# Patient Record
Sex: FEMALE | Race: WHITE | NOT HISPANIC OR LATINO | Employment: FULL TIME | URBAN - METROPOLITAN AREA
[De-identification: names, ages, dates, MRNs, and addresses within clinical notes are randomized per-mention and may not be internally consistent; named-entity substitution may affect disease eponyms.]

---

## 2023-03-22 ENCOUNTER — TELEPHONE (OUTPATIENT)
Dept: PRIMARY CARE | Facility: CLINIC | Age: 63
End: 2023-03-22
Payer: COMMERCIAL

## 2023-03-24 DIAGNOSIS — Z00.00 ROUTINE GENERAL MEDICAL EXAMINATION AT A HEALTH CARE FACILITY: ICD-10-CM

## 2023-03-24 DIAGNOSIS — Z12.31 BREAST CANCER SCREENING BY MAMMOGRAM: ICD-10-CM

## 2023-03-24 DIAGNOSIS — Z78.0 ASYMPTOMATIC POSTMENOPAUSAL STATE: Primary | ICD-10-CM

## 2023-05-12 ENCOUNTER — LAB (OUTPATIENT)
Dept: LAB | Facility: LAB | Age: 63
End: 2023-05-12
Payer: COMMERCIAL

## 2023-05-12 ENCOUNTER — OFFICE VISIT (OUTPATIENT)
Dept: PRIMARY CARE | Facility: CLINIC | Age: 63
End: 2023-05-12
Payer: COMMERCIAL

## 2023-05-12 VITALS
TEMPERATURE: 98 F | HEIGHT: 64 IN | OXYGEN SATURATION: 95 % | BODY MASS INDEX: 23.22 KG/M2 | SYSTOLIC BLOOD PRESSURE: 99 MMHG | WEIGHT: 136 LBS | DIASTOLIC BLOOD PRESSURE: 57 MMHG | RESPIRATION RATE: 16 BRPM | HEART RATE: 70 BPM

## 2023-05-12 DIAGNOSIS — N95.2 ATROPHIC VAGINITIS: ICD-10-CM

## 2023-05-12 DIAGNOSIS — M25.561 PAIN IN BOTH KNEES, UNSPECIFIED CHRONICITY: ICD-10-CM

## 2023-05-12 DIAGNOSIS — Z12.31 BREAST CANCER SCREENING BY MAMMOGRAM: ICD-10-CM

## 2023-05-12 DIAGNOSIS — A60.00 GENITAL HERPES SIMPLEX, UNSPECIFIED SITE: ICD-10-CM

## 2023-05-12 DIAGNOSIS — R05.3 CHRONIC COUGH: ICD-10-CM

## 2023-05-12 DIAGNOSIS — M25.562 PAIN IN BOTH KNEES, UNSPECIFIED CHRONICITY: ICD-10-CM

## 2023-05-12 DIAGNOSIS — Z00.00 ROUTINE GENERAL MEDICAL EXAMINATION AT A HEALTH CARE FACILITY: ICD-10-CM

## 2023-05-12 DIAGNOSIS — Z00.00 ROUTINE GENERAL MEDICAL EXAMINATION AT A HEALTH CARE FACILITY: Primary | ICD-10-CM

## 2023-05-12 LAB
ALANINE AMINOTRANSFERASE (SGPT) (U/L) IN SER/PLAS: 10 U/L (ref 7–45)
ALBUMIN (G/DL) IN SER/PLAS: 4.2 G/DL (ref 3.4–5)
ALKALINE PHOSPHATASE (U/L) IN SER/PLAS: 107 U/L (ref 33–136)
ANION GAP IN SER/PLAS: 12 MMOL/L (ref 10–20)
ASPARTATE AMINOTRANSFERASE (SGOT) (U/L) IN SER/PLAS: 12 U/L (ref 9–39)
BASOPHILS (10*3/UL) IN BLOOD BY AUTOMATED COUNT: 0.02 X10E9/L (ref 0–0.1)
BASOPHILS/100 LEUKOCYTES IN BLOOD BY AUTOMATED COUNT: 0.4 % (ref 0–2)
BILIRUBIN TOTAL (MG/DL) IN SER/PLAS: 0.5 MG/DL (ref 0–1.2)
CALCIDIOL (25 OH VITAMIN D3) (NG/ML) IN SER/PLAS: 36 NG/ML
CALCIUM (MG/DL) IN SER/PLAS: 10.2 MG/DL (ref 8.6–10.6)
CARBON DIOXIDE, TOTAL (MMOL/L) IN SER/PLAS: 25 MMOL/L (ref 21–32)
CHLORIDE (MMOL/L) IN SER/PLAS: 109 MMOL/L (ref 98–107)
CHOLESTEROL (MG/DL) IN SER/PLAS: 198 MG/DL (ref 0–199)
CHOLESTEROL IN HDL (MG/DL) IN SER/PLAS: 62.9 MG/DL
CHOLESTEROL/HDL RATIO: 3.1
CREATININE (MG/DL) IN SER/PLAS: 0.8 MG/DL (ref 0.5–1.05)
EOSINOPHILS (10*3/UL) IN BLOOD BY AUTOMATED COUNT: 0.06 X10E9/L (ref 0–0.7)
EOSINOPHILS/100 LEUKOCYTES IN BLOOD BY AUTOMATED COUNT: 1.3 % (ref 0–6)
ERYTHROCYTE DISTRIBUTION WIDTH (RATIO) BY AUTOMATED COUNT: 12.2 % (ref 11.5–14.5)
ERYTHROCYTE MEAN CORPUSCULAR HEMOGLOBIN CONCENTRATION (G/DL) BY AUTOMATED: 32.5 G/DL (ref 32–36)
ERYTHROCYTE MEAN CORPUSCULAR VOLUME (FL) BY AUTOMATED COUNT: 98 FL (ref 80–100)
ERYTHROCYTES (10*6/UL) IN BLOOD BY AUTOMATED COUNT: 3.89 X10E12/L (ref 4–5.2)
ESTIMATED AVERAGE GLUCOSE FOR HBA1C: 108 MG/DL
GFR FEMALE: 82 ML/MIN/1.73M2
GLUCOSE (MG/DL) IN SER/PLAS: 109 MG/DL (ref 74–99)
HEMATOCRIT (%) IN BLOOD BY AUTOMATED COUNT: 38.2 % (ref 36–46)
HEMOGLOBIN (G/DL) IN BLOOD: 12.4 G/DL (ref 12–16)
HEMOGLOBIN A1C/HEMOGLOBIN TOTAL IN BLOOD: 5.4 %
IMMATURE GRANULOCYTES/100 LEUKOCYTES IN BLOOD BY AUTOMATED COUNT: 0 % (ref 0–0.9)
IRON (UG/DL) IN SER/PLAS: 107 UG/DL (ref 35–150)
IRON BINDING CAPACITY (UG/DL) IN SER/PLAS: 422 UG/DL (ref 240–445)
IRON SATURATION (%) IN SER/PLAS: 25 % (ref 25–45)
LDL: 102 MG/DL (ref 0–99)
LEAD (UG/DL) IN BLOOD: <0.5 UG/DL (ref 0–4.9)
LEUKOCYTES (10*3/UL) IN BLOOD BY AUTOMATED COUNT: 4.6 X10E9/L (ref 4.4–11.3)
LYMPHOCYTES (10*3/UL) IN BLOOD BY AUTOMATED COUNT: 1.66 X10E9/L (ref 1.2–4.8)
LYMPHOCYTES/100 LEUKOCYTES IN BLOOD BY AUTOMATED COUNT: 36.1 % (ref 13–44)
MONOCYTES (10*3/UL) IN BLOOD BY AUTOMATED COUNT: 0.35 X10E9/L (ref 0.1–1)
MONOCYTES/100 LEUKOCYTES IN BLOOD BY AUTOMATED COUNT: 7.6 % (ref 2–10)
NEUTROPHILS (10*3/UL) IN BLOOD BY AUTOMATED COUNT: 2.51 X10E9/L (ref 1.2–7.7)
NEUTROPHILS/100 LEUKOCYTES IN BLOOD BY AUTOMATED COUNT: 54.6 % (ref 40–80)
NRBC (PER 100 WBCS) BY AUTOMATED COUNT: 0 /100 WBC (ref 0–0)
PLATELETS (10*3/UL) IN BLOOD AUTOMATED COUNT: 208 X10E9/L (ref 150–450)
POTASSIUM (MMOL/L) IN SER/PLAS: 3.9 MMOL/L (ref 3.5–5.3)
PROTEIN TOTAL: 6.7 G/DL (ref 6.4–8.2)
SODIUM (MMOL/L) IN SER/PLAS: 142 MMOL/L (ref 136–145)
THYROTROPIN (MIU/L) IN SER/PLAS BY DETECTION LIMIT <= 0.05 MIU/L: 0.9 MIU/L (ref 0.44–3.98)
TRIGLYCERIDE (MG/DL) IN SER/PLAS: 166 MG/DL (ref 0–149)
UREA NITROGEN (MG/DL) IN SER/PLAS: 25 MG/DL (ref 6–23)
VLDL: 33 MG/DL (ref 0–40)

## 2023-05-12 PROCEDURE — 83655 ASSAY OF LEAD: CPT

## 2023-05-12 PROCEDURE — 84443 ASSAY THYROID STIM HORMONE: CPT

## 2023-05-12 PROCEDURE — 80061 LIPID PANEL: CPT

## 2023-05-12 PROCEDURE — 80053 COMPREHEN METABOLIC PANEL: CPT

## 2023-05-12 PROCEDURE — 83550 IRON BINDING TEST: CPT

## 2023-05-12 PROCEDURE — 83540 ASSAY OF IRON: CPT

## 2023-05-12 PROCEDURE — 83036 HEMOGLOBIN GLYCOSYLATED A1C: CPT

## 2023-05-12 PROCEDURE — 99396 PREV VISIT EST AGE 40-64: CPT | Performed by: INTERNAL MEDICINE

## 2023-05-12 PROCEDURE — 85025 COMPLETE CBC W/AUTO DIFF WBC: CPT

## 2023-05-12 PROCEDURE — 82306 VITAMIN D 25 HYDROXY: CPT

## 2023-05-12 PROCEDURE — 36415 COLL VENOUS BLD VENIPUNCTURE: CPT

## 2023-05-12 RX ORDER — FOLIC ACID 1 MG/1
1 TABLET ORAL
COMMUNITY

## 2023-05-12 RX ORDER — ESTRADIOL 0.1 MG/G
2 CREAM VAGINAL DAILY
COMMUNITY
Start: 2022-10-25 | End: 2023-05-12 | Stop reason: SDUPTHER

## 2023-05-12 RX ORDER — OMEPRAZOLE 20 MG/1
20 CAPSULE, DELAYED RELEASE ORAL
COMMUNITY
Start: 2022-05-11 | End: 2023-05-12

## 2023-05-12 RX ORDER — ACYCLOVIR 800 MG/1
TABLET ORAL
Qty: 30 TABLET | Refills: 0 | Status: SHIPPED | OUTPATIENT
Start: 2023-05-12

## 2023-05-12 RX ORDER — ESTRADIOL 0.1 MG/G
2 CREAM VAGINAL DAILY
Qty: 42.5 G | Refills: 11 | Status: SHIPPED | OUTPATIENT
Start: 2023-05-12

## 2023-05-12 NOTE — PATIENT INSTRUCTIONS
Beatrice,     You are du for full lab work today.   You are due for mammogram August 3, 2023 or later; call [phone] to schedule   You are scheduled to see GI on 5.25.23 at 130PM and your endoscopy is scheduled 7.26.23 at 8AM at [facility].   Your tetanus shot is good until 2031! You are up to date with the shingles shot forever!   We don't ordinarily do bone density testing until age 65 and a pneumonia shot is also indicated at age 65!   Chest x-ray ordered for [address] radiology as a walk in!   For knee pain; you will also do bilateral knee x-rays today! If arthritis, I can discuss next steps (steroid injections into the knees done with orthopedics) - if NO arthritis, we would google exercises for patellofemoral tendinitis which is the other diagnosis this could be!   Try artificial tears like Systane Ultra preservative free!  See me in a year, sooner if needed!       CL

## 2023-05-12 NOTE — PROGRESS NOTES
Beatrice Moya is a 63 yo F presenting for CPE.     1. Odynophagia started PPI last year; messier stools described last eyar  [ ]s/p GI refrral 4.23 - pantoprazole uptitrated to BID   [ ]s/p ENT referral 3.23 felt c/w GERD   -tried the PPI and BID PPI but now off with no recurrence   -rare occasional Tums   -persistent but mild sxs   [ ]seeing GI in FU  5.25.23 and has EGD     3. Rectocele s/p repair, ?POP   -FU gyne ([facility])     4. Anxiety with concern for ADHD in the past     5. L index finger pain; h/o R lateral epicondylitis and L wrist tendinitis     6. PreDM     7. Occ LBP     8. Genital HSV h/o Valtrex prophy   -uses acyclovir presently abortively     9. Atrophic vaginitis   -estrogen topically with good relief!     10. Occasional R knee pain; brief when occurs but sharp in character       #HM   -shingrix x2; tdap 2021   [ ]labs   -paps OSH gyne   -cscope 3.21 - 10 yrs   -mammo 8.3.22         Gen Aox3 NAD well appearing   HEENT mmm pharynx clear no cervical LAD   Eyes sclerae clear   CV rrr nl s1/2 no m/r/g  Pulm CTAB no adventitious sounds   Ext warm dry no edema 2+ DP pulse

## 2023-07-26 ENCOUNTER — HOSPITAL ENCOUNTER (OUTPATIENT)
Dept: DATA CONVERSION | Facility: HOSPITAL | Age: 63
End: 2023-07-26
Attending: STUDENT IN AN ORGANIZED HEALTH CARE EDUCATION/TRAINING PROGRAM | Admitting: STUDENT IN AN ORGANIZED HEALTH CARE EDUCATION/TRAINING PROGRAM
Payer: COMMERCIAL

## 2023-07-26 DIAGNOSIS — R12 HEARTBURN: ICD-10-CM

## 2023-07-26 DIAGNOSIS — K21.9 GASTRO-ESOPHAGEAL REFLUX DISEASE WITHOUT ESOPHAGITIS: ICD-10-CM

## 2023-11-16 ENCOUNTER — OFFICE VISIT (OUTPATIENT)
Dept: GASTROENTEROLOGY | Facility: HOSPITAL | Age: 63
End: 2023-11-16
Payer: COMMERCIAL

## 2023-11-16 VITALS
TEMPERATURE: 97.4 F | SYSTOLIC BLOOD PRESSURE: 101 MMHG | WEIGHT: 134 LBS | RESPIRATION RATE: 18 BRPM | BODY MASS INDEX: 22.88 KG/M2 | DIASTOLIC BLOOD PRESSURE: 63 MMHG | OXYGEN SATURATION: 96 % | HEIGHT: 64 IN | HEART RATE: 77 BPM

## 2023-11-16 DIAGNOSIS — R19.7 DIARRHEA, UNSPECIFIED TYPE: Primary | ICD-10-CM

## 2023-11-16 DIAGNOSIS — N81.6 RECTOCELE: ICD-10-CM

## 2023-11-16 PROCEDURE — 1036F TOBACCO NON-USER: CPT | Performed by: STUDENT IN AN ORGANIZED HEALTH CARE EDUCATION/TRAINING PROGRAM

## 2023-11-16 PROCEDURE — 99204 OFFICE O/P NEW MOD 45 MIN: CPT | Performed by: STUDENT IN AN ORGANIZED HEALTH CARE EDUCATION/TRAINING PROGRAM

## 2023-11-16 PROCEDURE — 99214 OFFICE O/P EST MOD 30 MIN: CPT | Mod: GC | Performed by: STUDENT IN AN ORGANIZED HEALTH CARE EDUCATION/TRAINING PROGRAM

## 2023-11-16 RX ORDER — CALCIUM CARBONATE 500(1250)
1 TABLET ORAL
COMMUNITY

## 2023-11-16 SDOH — ECONOMIC STABILITY: FOOD INSECURITY: WITHIN THE PAST 12 MONTHS, YOU WORRIED THAT YOUR FOOD WOULD RUN OUT BEFORE YOU GOT MONEY TO BUY MORE.: NEVER TRUE

## 2023-11-16 SDOH — ECONOMIC STABILITY: FOOD INSECURITY: WITHIN THE PAST 12 MONTHS, THE FOOD YOU BOUGHT JUST DIDN'T LAST AND YOU DIDN'T HAVE MONEY TO GET MORE.: NEVER TRUE

## 2023-11-16 NOTE — PROGRESS NOTES
PCP: Melida Claudio MD     Beatrice Moya is a 63 y.o. female with PMH of anxiety presenting with change in stool. She was last seen in clinic in April 2023 for GERD at which time PPI was increased to twice daily.    History Of Present Illness:  Patient presents with a change in her bowel habits. She is having 5+ bowel movements daily. She states that her stools smell and she has increased bloating and distention (improved with lactaid). She has urgency and sensation of incomplete evacuation. She spends many hours a day on the toilet.  Rectocele surgery years ago. Gave birth once, vaginally.  She states that she has a longstanding history of constipation and she disimpacted herself manually for many years.  She additionally denies syncope/LOC, chest pain, dyspnea, cough, abdominal pain, dysphagia, nausea, vomiting, constipation, melena, hematochezia, dysuria, hematuria, lower extremity swelling, rash, fevers, chills, sick contacts, weight loss, or travel.     She denies personal or family history of IBD, liver disease or GI cancer.    12-point ROS was reviewed and is otherwise negative.    Pertinent Procedures:  EGD 7/2023  Impression:            - Z-line regular, 37 cm from the incisors.                         - Normal proximal esophagus, mid esophagus and distal                          esophagus.                         - Normal stomach.                         - Normal first portion of the duodenum and second                          portion of the duodenum.                         - No specimens collected.    Colonoscopy March 2021 without polyps    Social History:  -Lives with   -Occupation: works at the [employer]  -EtOH: 1-2 glasses of wine per week  -Recreational drugs: none  -Tobacco: none    Past Medical History:  She has a past medical history of Impacted cerumen, left ear (03/10/2017), Moderate cervical dysplasia, Otalgia, left ear (03/10/2017), Personal history of malignant neoplasm of  "breast, Personal history of other diseases of the nervous system and sense organs (04/26/2017), Personal history of other diseases of the respiratory system (03/10/2017), and Personal history of other diseases of the respiratory system (08/21/2017).    Home Medications:  Current Outpatient Medications   Medication Instructions    acyclovir (Zovirax) 800 mg tablet Take 1 tab (800 mg) by mouth 3 times a day for 2 days for flare ups    calcium carbonate (Oscal) 500 mg calcium (1,250 mg) tablet 1 tablet, oral, 2 times daily with meals    ergocalciferol, vitamin D2, (VITAMIN D2 ORAL) oral    estradiol (ESTRACE) 2 g, vaginal, Daily    folic acid (FOLVITE) 1 mg, oral, Daily RT    lactase (LACTAID ORAL) oral        Surgical History:  She has a past surgical history that includes Mastectomy (11/07/2016).     Social History:  She reports that she has never smoked. She has never used smokeless tobacco. She reports that she does not currently use alcohol. She reports that she does not currently use drugs.    Family History:  No family history on file.     Allergies:  Penicillins and Sutures    OBJECTIVES:  Vital Signs:  Blood pressure 101/63, pulse 77, temperature 36.3 °C (97.4 °F), resp. rate 18, height 1.626 m (5' 4\"), weight 60.8 kg (134 lb), SpO2 96 %.    Physical Exam:  General: Patient is in NAD.  Neuro: A and O x 3, CN 1-12 grossly intact, no asterixis.  HEENT: MMM, sclera anicteric.  CV: RRR, no m/r/g.  Pulm: nonlabored breathing on room air  Abd: Soft, NBS in 4q, NTTP, no rebound or guarding, no hepatosplenomegaly.  Ext: Warm and well-perfused.  Psych: Anxious    Assessment/Plan   Patient is a 62 yo F with PMH of constipation and rectocele requiring surgical repair who presents with diarrhea which is foul smelling and associated with urgency. Her symptoms are concerning for overflow incontinence given history of chronic constipation and manual disimpaction. IBS-D, pancreatic insufficiency/ malabsorptive disorder, or " infectious diarrhea are possible but less likely.    - Fecal Fat, Qualitative; Future  - Pancreatic Elastase, Fecal; Future  - Calprotectin Stool; Future  - Follow Up In Gastroenterology; Future  - Stool Pathogen Panel, PCR  - Anal Manometry; Future  - Referral to Physical Therapy; Future    RTC in 4 months.    Case seen and discussed with attending, Dr. Lazaro.    Karyna Cooney MD

## 2023-11-16 NOTE — PATIENT INSTRUCTIONS
Dear Ms. Moya,    It was a pleasure meeting you in clinic today.    Please make sure you drop off your stool samples and the anal manometry done. I recommend you take metamucil 1 tablespoon once daily with 8 oz liquid.    Please return to clinic in 4 months.    Best regards,    Karyna Cooney MD  Division of Gastroenterology and Liver Disease  [id number] [place] [address] [address] [place] [id number]  Appt. Phone   Inquiries   Fax  or

## 2023-11-16 NOTE — PROGRESS NOTES
Adair Lazaro MD PhD    I saw and evaluated the patient. I personally obtained the key and critical portions of the history and physical exam or was physically present for key and critical portions performed by the trainee. I agree with the trainee's medical decision making as described in the trainee's note.    Adair Lazaro MD

## 2023-11-16 NOTE — LETTER
November 16, 2023     Melida Claudio MD  [address]  [facility] [address]  [facility] [address]    Patient: Beatrice Moya   YOB: 1960   Date of Visit: 11/16/2023       Dear Dr. Melida Claudio MD:    Thank you for referring Beatrice Moya to me for evaluation. Below are my notes for this consultation.  If you have questions, please do not hesitate to call me. I look forward to following your patient along with you.       Sincerely,     Karyna Cooney MD      CC: No Recipients  ______________________________________________________________________________________    PCP: Melida Claudio MD     Beatrice Moya is a 63 y.o. female with PMH of anxiety presenting with change in stool. She was last seen in clinic in April 2023 for GERD at which time PPI was increased to twice daily.    History Of Present Illness:  Patient presents with a change in her bowel habits. She is having 5+ bowel movements daily. She states that her stools smell and she has increased bloating and distention (improved with lactaid). She has urgency and sensation of incomplete evacuation. She spends many hours a day on the toilet.  Rectocele surgery years ago. Gave birth once, vaginally.  She states that she has a longstanding history of constipation and she disimpacted herself manually for many years.  She additionally denies syncope/LOC, chest pain, dyspnea, cough, abdominal pain, dysphagia, nausea, vomiting, constipation, melena, hematochezia, dysuria, hematuria, lower extremity swelling, rash, fevers, chills, sick contacts, weight loss, or travel.     She denies personal or family history of IBD, liver disease or GI cancer.    12-point ROS was reviewed and is otherwise negative.    Pertinent Procedures:  EGD 7/2023  Impression:            - Z-line regular, 37 cm from the incisors.                         - Normal proximal esophagus, mid esophagus and distal                           "esophagus.                         - Normal stomach.                         - Normal first portion of the duodenum and second                          portion of the duodenum.                         - No specimens collected.    Colonoscopy March 2021 without polyps    Social History:  -Lives with   -Occupation: works at the [employer]  -EtOH: 1-2 glasses of wine per week  -Recreational drugs: none  -Tobacco: none    Past Medical History:  She has a past medical history of Impacted cerumen, left ear (03/10/2017), Moderate cervical dysplasia, Otalgia, left ear (03/10/2017), Personal history of malignant neoplasm of breast, Personal history of other diseases of the nervous system and sense organs (04/26/2017), Personal history of other diseases of the respiratory system (03/10/2017), and Personal history of other diseases of the respiratory system (08/21/2017).    Home Medications:  Current Outpatient Medications   Medication Instructions   • acyclovir (Zovirax) 800 mg tablet Take 1 tab (800 mg) by mouth 3 times a day for 2 days for flare ups   • calcium carbonate (Oscal) 500 mg calcium (1,250 mg) tablet 1 tablet, oral, 2 times daily with meals   • ergocalciferol, vitamin D2, (VITAMIN D2 ORAL) oral   • estradiol (ESTRACE) 2 g, vaginal, Daily   • folic acid (FOLVITE) 1 mg, oral, Daily RT   • lactase (LACTAID ORAL) oral        Surgical History:  She has a past surgical history that includes Mastectomy (11/07/2016).     Social History:  She reports that she has never smoked. She has never used smokeless tobacco. She reports that she does not currently use alcohol. She reports that she does not currently use drugs.    Family History:  No family history on file.     Allergies:  Penicillins and Sutures    OBJECTIVES:  Vital Signs:  Blood pressure 101/63, pulse 77, temperature 36.3 °C (97.4 °F), resp. rate 18, height 1.626 m (5' 4\"), weight 60.8 kg (134 lb), SpO2 96 %.    Physical Exam:  General: Patient is in " NAD.  Neuro: A and O x 3, CN 1-12 grossly intact, no asterixis.  HEENT: MMM, sclera anicteric.  CV: RRR, no m/r/g.  Pulm: nonlabored breathing on room air  Abd: Soft, NBS in 4q, NTTP, no rebound or guarding, no hepatosplenomegaly.  Ext: Warm and well-perfused.  Psych: Anxious    Assessment/Plan  Patient is a 64 yo F with PMH of constipation and rectocele requiring surgical repair who presents with diarrhea which is foul smelling and associated with urgency. Her symptoms are concerning for overflow incontinence given history of chronic constipation and manual disimpaction. IBS-D, pancreatic insufficiency/ malabsorptive disorder, or infectious diarrhea are possible but less likely.    - Fecal Fat, Qualitative; Future  - Pancreatic Elastase, Fecal; Future  - Calprotectin Stool; Future  - Follow Up In Gastroenterology; Future  - Stool Pathogen Panel, PCR  - Anal Manometry; Future  - Referral to Physical Therapy; Future    RTC in 4 months.    Case seen and discussed with attending, Dr. Lazaro.    Karyna Cooney MD

## 2023-11-17 ENCOUNTER — LAB (OUTPATIENT)
Dept: LAB | Facility: LAB | Age: 63
End: 2023-11-17
Payer: COMMERCIAL

## 2023-11-17 DIAGNOSIS — R19.7 DIARRHEA, UNSPECIFIED TYPE: ICD-10-CM

## 2023-11-17 PROCEDURE — 89125 SPECIMEN FAT STAIN: CPT

## 2023-11-20 ENCOUNTER — OFFICE VISIT (OUTPATIENT)
Dept: RHEUMATOLOGY | Facility: CLINIC | Age: 63
End: 2023-11-20
Payer: COMMERCIAL

## 2023-11-20 VITALS
SYSTOLIC BLOOD PRESSURE: 114 MMHG | DIASTOLIC BLOOD PRESSURE: 54 MMHG | WEIGHT: 133 LBS | BODY MASS INDEX: 22.83 KG/M2 | HEART RATE: 65 BPM

## 2023-11-20 DIAGNOSIS — M89.9 SCAPULAR DYSFUNCTION: ICD-10-CM

## 2023-11-20 DIAGNOSIS — M54.9 UPPER BACK PAIN ON RIGHT SIDE: Primary | ICD-10-CM

## 2023-11-20 DIAGNOSIS — M19.042 PRIMARY OSTEOARTHRITIS OF BOTH HANDS: ICD-10-CM

## 2023-11-20 DIAGNOSIS — M19.041 PRIMARY OSTEOARTHRITIS OF BOTH HANDS: ICD-10-CM

## 2023-11-20 LAB
FAT STL QL: ABNORMAL
NEUTRAL FAT STL QL: NORMAL

## 2023-11-20 PROCEDURE — 99203 OFFICE O/P NEW LOW 30 MIN: CPT | Performed by: INTERNAL MEDICINE

## 2023-11-20 PROCEDURE — 1036F TOBACCO NON-USER: CPT | Performed by: INTERNAL MEDICINE

## 2023-11-20 NOTE — PROGRESS NOTES
Subjective   Patient ID: Beatrice Moya is a 63 y.o. female who presents for Back Pain (NPV- Pt c/o pain in back, fingers, left shoulder, and hips. No current treatment.).    HPI  DC can not help  Various pains  Intermittent   Stabbing shoulder blade pain 3 years   Driving band bra scrap are uncomfortalbe   Tightness this pain is new.driving to [employer]   Dc told rib out and fixed it but this time no help  Massage gone away and yesterday   No meds   Shoulder issues for years sees OT has tendonitis   Needs new pt as the old not taking her insurance and does not do that area  Thumbs OA cmc joints      ROS      Current Outpatient Medications   Medication Sig Dispense Refill    acyclovir (Zovirax) 800 mg tablet Take 1 tab (800 mg) by mouth 3 times a day for 2 days for flare ups 30 tablet 0    calcium carbonate (Oscal) 500 mg calcium (1,250 mg) tablet Take 1 tablet (1,250 mg) by mouth 2 times a day with meals.      cholecalciferol, vitamin D3, 350 mcg (14,000 unit) capsule Take by mouth.      estradiol (Estrace) 0.1 MG/GM vaginal cream Insert 2 g into the vagina once daily. 42.5 g 11    folic acid (Folvite) 1 mg tablet Take 1 tablet (1 mg) by mouth once daily.      lactase (LACTAID ORAL) Take by mouth.      ergocalciferol, vitamin D2, (VITAMIN D2 ORAL) Take by mouth.      psyllium (Metamucil) powder Take 1 Dose (5.8 g) by mouth 3 times a day.       No current facility-administered medications for this visit.         has a past medical history of Impacted cerumen, left ear (03/10/2017), Moderate cervical dysplasia, Otalgia, left ear (03/10/2017), Personal history of malignant neoplasm of breast, Personal history of other diseases of the nervous system and sense organs (04/26/2017), Personal history of other diseases of the respiratory system (03/10/2017), and Personal history of other diseases of the respiratory system (08/21/2017).   Past Surgical History:   Procedure Laterality Date    MASTECTOMY  11/07/2016     Breast Surgery Mastectomy      Social History     Tobacco Use    Smoking status: Never    Smokeless tobacco: Never   Vaping Use    Vaping Use: Never used   Substance Use Topics    Alcohol use: Not Currently    Drug use: Not Currently      family history is not on file.  Pain Management Panel           No data to display                 Vitals:    11/20/23 1141   BP: 114/54   Pulse: 65     Lab Results   Component Value Date    SEDRATE 2 10/13/2020    CRP 0.31 10/13/2020    TSH 0.90 05/12/2023       PHYSICAL EXAM      NODES -  HEART  LUNGS  ABDOMEN   VASCULAR  NEURO -  SKIN  JOINTS Normal rom thruout + cmc OA omari with lom   Shoulders normal  Slight tight para vertebral r and sub scapular     PLAN  Diagnoses and all orders for this visit:  Upper back pain on right side  -     Referral to Physical Therapy; Future  Scapular dysfunction  -     Referral to Physical Therapy; Future    Chronic intermittent stabbing pain in the the subscapular (shoulder blade) area for about 3 years  Some tightness in the area as well  No better with chiropractic adjustment  Examination reveals some slight tightness at the mid thoracic paravertebral muscles and in the subscapular area  There are also changes of osteoarthritis at CMC C joints bilaterally    Plan,  Suggested physical therapy  Could use tumeric  But try acetaminophen for CMC arthritis pain

## 2023-11-21 DIAGNOSIS — R19.7 DIARRHEA, UNSPECIFIED TYPE: Primary | ICD-10-CM

## 2023-11-25 ENCOUNTER — LAB (OUTPATIENT)
Dept: LAB | Facility: LAB | Age: 63
End: 2023-11-25
Payer: COMMERCIAL

## 2023-11-25 DIAGNOSIS — R19.7 DIARRHEA, UNSPECIFIED TYPE: ICD-10-CM

## 2023-11-25 PROCEDURE — 82653 EL-1 FECAL QUANTITATIVE: CPT

## 2023-11-25 PROCEDURE — 87506 IADNA-DNA/RNA PROBE TQ 6-11: CPT

## 2023-11-25 PROCEDURE — 83993 ASSAY FOR CALPROTECTIN FECAL: CPT

## 2023-11-26 LAB

## 2023-11-28 LAB — CALPROTECTIN STL-MCNT: <5 UG/G

## 2023-11-29 LAB — ELASTASE PANC STL-MCNT: >800 UG/G

## 2023-12-05 ENCOUNTER — HOSPITAL ENCOUNTER (OUTPATIENT)
Dept: GASTROENTEROLOGY | Facility: HOSPITAL | Age: 63
Setting detail: OUTPATIENT SURGERY
Discharge: HOME | End: 2023-12-05
Payer: COMMERCIAL

## 2023-12-05 VITALS
SYSTOLIC BLOOD PRESSURE: 129 MMHG | RESPIRATION RATE: 18 BRPM | DIASTOLIC BLOOD PRESSURE: 70 MMHG | OXYGEN SATURATION: 99 % | HEART RATE: 58 BPM

## 2023-12-05 DIAGNOSIS — N81.6 RECTOCELE: ICD-10-CM

## 2023-12-05 PROCEDURE — 2720000007 HC OR 272 NO HCPCS

## 2023-12-05 PROCEDURE — 91122 HC ANAL PRESSURE RECORD - MANOMETRY ANAL: CPT

## 2023-12-05 ASSESSMENT — PAIN SCALES - GENERAL
PAINLEVEL_OUTOF10: 0 - NO PAIN
PAINLEVEL_OUTOF10: 0 - NO PAIN

## 2023-12-05 ASSESSMENT — PAIN - FUNCTIONAL ASSESSMENT
PAIN_FUNCTIONAL_ASSESSMENT: 0-10
PAIN_FUNCTIONAL_ASSESSMENT: 0-10

## 2023-12-05 NOTE — NURSING NOTE
Hydrogen Breath Analysis Consultation Sheet    Referring Provider: Karyna Cooney Md  [id number] [place] [address]    Indication: ***    Symptoms: ***    Age: 63 y.o.  Weight: There were no vitals filed for this visit.  Substrate: ***   Dose: ***    Last Meal: ***  Recent Antibiotics: ***    RESULTS:   Time PPM (H2) APPM* (CH4) CO2 Correction   Baseline #1        Baseline #2        *Challenge Dose Sugar: ***  15'        30'        45'        60'        75'        90'        105'        120'        135'        150'        165'        180'          Impression: ***

## 2023-12-05 NOTE — NURSING NOTE
Anorectal Manometry    After standard calibration of AMOT catheter and digital exam, placed catheter in rectum to 10 cm.  Performed all standard measurements.  Patient was able to expel catheter intact with 50 ml of air in it.  Discharged pt to home in stable condition without complaint.

## 2023-12-05 NOTE — DISCHARGE INSTRUCTIONS
Return to normal diet, medications and activities. You may experience slight soreness and minimal bleeding near the rectum for today. Follow up with ordering provider, Dr. Karyna Coonye, in about two weeks for your results. Please call [phone] if you need to speak with the manometry nurse about your test.

## 2024-01-15 DIAGNOSIS — R27.8 DYSSYNERGIA: Primary | ICD-10-CM

## 2024-02-15 ENCOUNTER — DOCUMENTATION (OUTPATIENT)
Dept: PHYSICAL THERAPY | Facility: CLINIC | Age: 64
End: 2024-02-15
Payer: COMMERCIAL

## 2024-02-15 NOTE — PROGRESS NOTES
Physical Therapy                 Therapy Communication Note    Patient Name: Beatrice Moya  MRN: [id number]  Today's Date: 2/15/2024     Discipline: Physical Therapy    Missed Visit Reason:      Missed Time: No Show    Comment: Pt no show for initial evaluation

## 2024-03-13 ENCOUNTER — EVALUATION (OUTPATIENT)
Dept: PHYSICAL THERAPY | Facility: CLINIC | Age: 64
End: 2024-03-13
Payer: COMMERCIAL

## 2024-03-13 DIAGNOSIS — M54.6 ACUTE RIGHT-SIDED THORACIC BACK PAIN: Primary | ICD-10-CM

## 2024-03-13 DIAGNOSIS — M54.6 THORACIC BACK PAIN: ICD-10-CM

## 2024-03-13 PROCEDURE — 97110 THERAPEUTIC EXERCISES: CPT | Mod: GP | Performed by: PHYSICAL THERAPIST

## 2024-03-13 PROCEDURE — 97161 PT EVAL LOW COMPLEX 20 MIN: CPT | Mod: GP | Performed by: PHYSICAL THERAPIST

## 2024-03-13 NOTE — PROGRESS NOTES
Physical Therapy    Physical Therapy Evaluation and Treatment      Patient Name: Beatrice Moya  MRN: [id number]  Today's Date: 3/14/2024  PT Evaluation Time Entry  PT Evaluation (Low) Time Entry: 25  PT Therapeutic Procedures Time Entry  Therapeutic Exercise Time Entry: 10      Visit: 1    Assessment:  Pt presents to clinic with chief complaint of R sided thoracic back pain and medial border of scapula pain. Pt states symptoms began after she helped move her mother from her home back in October. She did a large amount of lifting with associated forward flexion. She states pain is better, but she still does not feel 100%. She demonstrates mild postural impairments, mild decrease in scapular stretch, and mild decrease in thoracolumbar AROM. Pt will benefit from skilled therapy to address current impairments for reduced  pain    Plan:  OP PT Plan  Treatment/Interventions: Cryotherapy, Education/ Instruction, Hot pack, Manual therapy, Neuromuscular re-education, Self care/ home management, Therapeutic activities, Therapeutic exercises  PT Plan: Skilled PT  PT Frequency: 1 time per week  Certification Period Start Date: 03/13/24  Certification Period End Date: 06/11/24  Rehab Potential: Good  Plan of Care Agreement: Patient    Current Problem:   1. Acute right-sided thoracic back pain        2. Thoracic back pain  Referral to Physical Therapy          Subjective    General:  General  Reason for Referral: R thoracic back pain, R scapular pain  Referred By: Dr. Adair Lazaro  Preferred Learning Style: visual, written  Precautions:  Precautions  STEADI Fall Risk Score (The score of 4 or more indicates an increased risk of falling): 0  Precautions Comment: None      Subjective:    Chief complaints: R thoracic back pain, scapular pain  Onset/Surgery Date: October 2023  Mechanism of Injury: lifting/helping her mother move   Previous History: no; hx of LBP; hx of cervical pain     Pain: 2/10      Location: R thoracic spine, R  medial border of scapula     Type: sharp pain, achy     Aggravators: forward flexion, riding bike, lifting, sleeping on R side, leaning on back or pressure     Alleviators: Heat, position changes     Function:    Prior Level: fully functional     Current limitations: See aggravators     Condition: Improving       Sleep:     Disturbed: Yes    Preferred position(s): trying to get comfortable can be difficult       Goals for Therapy:    Reduce pain, increase strength        Objective      Observation/Posture:    Increased thoracic kyphosis    Bilat abd scapulae   Rounded shoulder posturing    Depressed, downwardly rotated R scapula       ROM/Flexibility:    Thoracolumbar ROM    Flexion: 80 deg       Extension: 20 deg       Sidebend R / L: 20 deg / 25 deg      Rotation R / L: 50% / 75%, mild tension/pain R scapula       Strength R / L:     Shoulder flexion: 5/5    Shoulder abd: 5/5    Shoulder ER: 5/5    Shoulder IR: 5/5    Serratus anterior: 4+/4+        Elbow flexion: 5/5    Elbow extension: 5/5       Neurological: Sensation is intact and symmetrical in BLEs.      Special Tests:     Full can (-)    Empty can (-)     Outcome Measure:    YAMILET: 5 / 50 = 10 % impaired     TREATMENT  Therapeutic exercise:   Seated thoracic mobilizations  Wall wash  Thread the needle   Serratus punch against resistance (red)        Goals:  Active       PT Problem       Pt will demonstrate full and pain-free thoracolumbar AROM for improved ability to ride bike without pain       Start:  03/14/24    Expected End:  06/11/24            Pt will increase serratus anterior strength by 1 MMT grade for improved scapular stability       Start:  03/14/24    Expected End:  06/11/24            Pt will be independent in HEP for home maintenance        Start:  03/14/24    Expected End:  06/11/24

## 2024-03-28 ENCOUNTER — TREATMENT (OUTPATIENT)
Dept: PHYSICAL THERAPY | Facility: CLINIC | Age: 64
End: 2024-03-28
Payer: COMMERCIAL

## 2024-03-28 DIAGNOSIS — M54.6 ACUTE RIGHT-SIDED THORACIC BACK PAIN: Primary | ICD-10-CM

## 2024-03-28 DIAGNOSIS — M54.6 THORACIC BACK PAIN: ICD-10-CM

## 2024-03-28 PROCEDURE — 97140 MANUAL THERAPY 1/> REGIONS: CPT | Mod: GP,CQ

## 2024-03-28 PROCEDURE — 97110 THERAPEUTIC EXERCISES: CPT | Mod: GP,CQ

## 2024-03-28 NOTE — PROGRESS NOTES
"Physical Therapy    Physical Therapy Treatment      Patient Name: Beatrice Moya  MRN: [id number]  Today's Date: 3/28/2024     PT Therapeutic Procedures Time Entry  Manual Therapy Time Entry: 15  Therapeutic Exercise Time Entry: 35      Visit: 2    Assessment:  Pt reports R OA in thumb as well as L shoulder dysfunction.  Pt struggled w/ resistance band progression as well as working in comfortable range w/o producing increased sx's    Plan:   Prone ex's    Current Problem:   1. Acute right-sided thoracic back pain        2. Thoracic back pain              Subjective    \"No problems w/ ex's.  I was more compliant initially.\"    Pain: 2/10         Objective   AROM c-spine rotation 65` R, 67` L       Observation/Posture:    Increased thoracic kyphosis    Bilat abd scapulae   Rounded shoulder posturing    Depressed, downwardly rotated R scapula       ROM/Flexibility:    Thoracolumbar ROM    Flexion: 80 deg       Extension: 20 deg       Sidebend R / L: 20 deg / 25 deg      Rotation R / L: 50% / 75%, mild tension/pain R scapula       Strength R / L:     Shoulder flexion: 5/5    Shoulder abd: 5/5    Shoulder ER: 5/5    Shoulder IR: 5/5    Serratus anterior: 4+/4+        Elbow flexion: 5/5    Elbow extension: 5/5       Neurological: Sensation is intact and symmetrical in BLEs.      Special Tests:     Full can (-)    Empty can (-)     Outcome Measure:    YAMILET: 5 / 50 = 10 % impaired     TREATMENT  Manual Therapy seated/supine bilat UT/upper thoracic spine x 5 min  Supine lat glides, bilat shoulder mobs x 10 min  Therapeutic exercise:   Supine yellow loop bilat ER x 15  Red band hor abd x 10  #2 serratus press x 20  Upper cervical extension over towel x 20  SNAGs x 10 ea  Red abnd rows x 10  Doorway pec stretch 5x10\"  Wall wash  Thread the needle   Serratus punch against resistance (red)        Goals:  Active       PT Problem       Pt will demonstrate full and pain-free thoracolumbar AROM for improved ability to ride bike " without pain       Start:  03/14/24    Expected End:  06/11/24            Pt will increase serratus anterior strength by 1 MMT grade for improved scapular stability       Start:  03/14/24    Expected End:  06/11/24            Pt will be independent in HEP for home maintenance        Start:  03/14/24    Expected End:  06/11/24

## 2024-04-04 ENCOUNTER — OFFICE VISIT (OUTPATIENT)
Dept: GASTROENTEROLOGY | Facility: HOSPITAL | Age: 64
End: 2024-04-04
Payer: COMMERCIAL

## 2024-04-04 VITALS
HEIGHT: 64 IN | BODY MASS INDEX: 23.73 KG/M2 | SYSTOLIC BLOOD PRESSURE: 135 MMHG | HEART RATE: 86 BPM | TEMPERATURE: 97.6 F | OXYGEN SATURATION: 96 % | WEIGHT: 139 LBS | DIASTOLIC BLOOD PRESSURE: 85 MMHG

## 2024-04-04 DIAGNOSIS — R27.8 DYSSYNERGIA: Primary | ICD-10-CM

## 2024-04-04 PROCEDURE — 99214 OFFICE O/P EST MOD 30 MIN: CPT | Performed by: STUDENT IN AN ORGANIZED HEALTH CARE EDUCATION/TRAINING PROGRAM

## 2024-04-04 PROCEDURE — 99214 OFFICE O/P EST MOD 30 MIN: CPT | Mod: GC | Performed by: STUDENT IN AN ORGANIZED HEALTH CARE EDUCATION/TRAINING PROGRAM

## 2024-04-04 PROCEDURE — 1036F TOBACCO NON-USER: CPT | Performed by: STUDENT IN AN ORGANIZED HEALTH CARE EDUCATION/TRAINING PROGRAM

## 2024-04-04 ASSESSMENT — PAIN SCALES - GENERAL: PAINLEVEL: 0-NO PAIN

## 2024-04-04 NOTE — PATIENT INSTRUCTIONS
Dear Ms. Moya,    It was a pleasure seeing you in clinic today. I am so glad to see that you are doing better. Keep the same regimen. It seems like you are really finding what is working for you.    Please return to clinic if needed!    Best regards,    Karyna Cooney MD  Division of Gastroenterology and Liver Disease  [address] [place] [address] [address]  [place] [address]  Appt. Phone   Inquiries   Fax  or

## 2024-04-04 NOTE — PROGRESS NOTES
PCP: Melida Claudio MD    Referring provider: No ref. provider found    Beatrice Moya is a 63 y.o. female with PMH of anxiety presenting for follow up.    History Of Present Illness:  She was last seen in November 2023. At the time she was having 5+ bowel movements which was new compared to her lifelong history of constipation (requiring manual disimpaction). They were foul smelling. She had bloating and distention.    Since last visit, she is feeling better. She completed pelvic floor physical therapy and has found a combination of fiber supplementation. She is using a squatty potty. She  states that abdominal pain and bloating is also improved. Her symptoms are exacerbated by high carbohydrate loads.    She additionally denies syncope/LOC, chest pain, dyspnea, cough, abdominal pain, dysphagia, nausea, vomiting, constipation, melena, hematochezia, dysuria, hematuria, lower extremity swelling, rash, fevers, chills, sick contacts, weight loss, or travel.     12-point ROS was reviewed and is otherwise negative.    Pertinent Procedures:  EGD 7/2023  Impression:            - Z-line regular, 37 cm from the incisors.                         - Normal proximal esophagus, mid esophagus and distal                          esophagus.                         - Normal stomach.                         - Normal first portion of the duodenum and second                          portion of the duodenum.                         - No specimens collected.     Colonoscopy March 2021 without polyps    SH:  -Lives with   -Occupation: works at the [employer]  -EtOH: 1-2 glasses of wine per week  -Recreational drugs: none  -Tobacco: none    Past Medical History:  She has a past medical history of Impacted cerumen, left ear (03/10/2017), Moderate cervical dysplasia, Otalgia, left ear (03/10/2017), Personal history of malignant neoplasm of breast, Personal history of other diseases of the nervous system and sense organs  "(04/26/2017), Personal history of other diseases of the respiratory system (03/10/2017), and Personal history of other diseases of the respiratory system (08/21/2017).    Home Medications:  Current Outpatient Medications   Medication Instructions    acyclovir (Zovirax) 800 mg tablet Take 1 tab (800 mg) by mouth 3 times a day for 2 days for flare ups    calcium carbonate (Oscal) 500 mg calcium (1,250 mg) tablet 1 tablet, oral, 2 times daily with meals    cholecalciferol, vitamin D3, 350 mcg (14,000 unit) capsule oral    ergocalciferol, vitamin D2, (VITAMIN D2 ORAL) oral    estradiol (ESTRACE) 2 g, vaginal, Daily    folic acid (FOLVITE) 1 mg, oral, Daily RT    lactase (LACTAID ORAL) oral    psyllium (Metamucil) powder 1 Dose, oral, 3 times daily      Surgical History:  She has a past surgical history that includes Mastectomy (11/07/2016).     Social History:  She reports that she has quit smoking. Her smoking use included cigarettes. She has never been exposed to tobacco smoke. She has never used smokeless tobacco. She reports current alcohol use. She reports that she does not currently use drugs.    Family History:  No family history on file.     Allergies:  Penicillins and Sutures    OBJECTIVE:  Vital Signs:  Blood pressure 135/85, pulse 86, temperature 36.4 °C (97.6 °F), height 1.626 m (5' 4\"), weight 63 kg (139 lb), SpO2 96 %.    Physical Exam:  General: Patient is in NAD.  Neuro: A and O x 3, CN 1-12 grossly intact, no asterixis.  HEENT: moist mucosa, sclera anicteric, EOMI  CV: regular rate  Pulm: nonlabored breathing on room air  Abd: Soft, nontender/ nondistended, +BS, no rebound or guarding, no hepatosplenomegaly.  Ext: Warm and well-perfused.  Psych: Appropriate mood and behavior.    Labs:  Lab Results   Component Value Date    WBC 4.6 05/12/2023    HGB 12.4 05/12/2023    HCT 38.2 05/12/2023    MCV 98 05/12/2023     05/12/2023     Lab Results   Component Value Date    GLUCOSE 109 (H) 05/12/2023    " CALCIUM 10.2 05/12/2023     05/12/2023    K 3.9 05/12/2023    CO2 25 05/12/2023     (H) 05/12/2023    BUN 25 (H) 05/12/2023    CREATININE 0.80 05/12/2023     Lab Results   Component Value Date    ALT 10 05/12/2023    AST 12 05/12/2023    ALKPHOS 107 05/12/2023    BILITOT 0.5 05/12/2023       Assessment/Plan   Patient is a 62 yo F with PMH of constipation and rectocele requiring surgical repair who presented in November 2023 with foul smelling diarrhea associated with urgency. Her symptoms were concerning for overflow incontinence given history of chronic constipation and manual disimpaction. There was no concern for IBS-D, pancreatic insufficiency/ malabsorptive disorder, or infectious diarrhea with normal labs and stool studies. Patient's symptoms have significantly improved with dietary changes, fiber supplementation and pelvic floor PT.    -Continue supportive care    RTC as needed .    Case seen, examined and discussed with attending, Dr. Samano.

## 2024-04-16 ENCOUNTER — TREATMENT (OUTPATIENT)
Dept: PHYSICAL THERAPY | Facility: CLINIC | Age: 64
End: 2024-04-16
Payer: COMMERCIAL

## 2024-04-16 DIAGNOSIS — M54.6 ACUTE RIGHT-SIDED THORACIC BACK PAIN: Primary | ICD-10-CM

## 2024-04-16 DIAGNOSIS — M54.6 THORACIC BACK PAIN: ICD-10-CM

## 2024-04-16 PROCEDURE — 97140 MANUAL THERAPY 1/> REGIONS: CPT | Mod: GP,CQ

## 2024-04-16 PROCEDURE — 97110 THERAPEUTIC EXERCISES: CPT | Mod: GP,CQ

## 2024-04-16 NOTE — PROGRESS NOTES
"Physical Therapy    Physical Therapy    Physical Therapy Treatment      Patient Name: Beatrice Moya  MRN: [id number]  Today's Date: 4/16/2024            Visit: 3    Assessment:  Tender to touch R thoracic spine, decreased w/ mobs.  Cues throughout rx to engage core for neutral spine positioning    Plan:  Foam roller for thoracic extension    Current Problem:   1. Acute right-sided thoracic back pain        2. Thoracic back pain              Subjective    \"It's been a busy couple of weeks, and I haven't done the ex's like I should.  Wall washing and thread the needle feel good\"    Pain: 0/10         Objective   AROM c-spine rotation 65` R, 67` L       Observation/Posture:    Increased thoracic kyphosis    Bilat abd scapulae   Rounded shoulder posturing    Depressed, downwardly rotated R scapula       ROM/Flexibility:    Thoracolumbar ROM    Flexion: 80 deg       Extension: 20 deg       Sidebend R / L: 20 deg / 25 deg      Rotation R / L: 50% / 75%, mild tension/pain R scapula       Strength R / L:     Shoulder flexion: 5/5    Shoulder abd: 5/5    Shoulder ER: 5/5    Shoulder IR: 5/5    Serratus anterior: 4+/4+        Elbow flexion: 5/5    Elbow extension: 5/5       Neurological: Sensation is intact and symmetrical in BLEs.      Special Tests:     Full can (-)    Empty can (-)     Outcome Measure:    YAMILET: 5 / 50 = 10 % impaired     TREATMENT  Manual Therapy   Supine c-spine R shoulder mobs, Prone R T-spine x 15 min  Therapeutic exercise:   Supine yellow loop bilat ER x 20  Red band hor abd x 20  #2 serratus press x 20  Prone-W,T x 10 ea  Upper cervical extension over towel x 20  SNAGs x 10 ea  Red band rows x 10  Doorway pec stretch 5x10\"  Wall wash x 20  Thread the needle x 10 ea  Prone HP to midback EOS       Goals:  Active       PT Problem       Pt will demonstrate full and pain-free thoracolumbar AROM for improved ability to ride bike without pain       Start:  03/14/24    Expected End:  06/11/24            Pt will " increase serratus anterior strength by 1 MMT grade for improved scapular stability       Start:  03/14/24    Expected End:  06/11/24            Pt will be independent in HEP for home maintenance        Start:  03/14/24    Expected End:  06/11/24

## 2024-04-17 DIAGNOSIS — K21.9 GASTROESOPHAGEAL REFLUX DISEASE WITHOUT ESOPHAGITIS: Primary | ICD-10-CM

## 2024-04-17 RX ORDER — OMEPRAZOLE 40 MG/1
40 CAPSULE, DELAYED RELEASE ORAL DAILY
Qty: 90 CAPSULE | Refills: 3 | Status: SHIPPED | OUTPATIENT
Start: 2024-04-17 | End: 2024-04-26

## 2024-04-26 DIAGNOSIS — K21.9 GASTROESOPHAGEAL REFLUX DISEASE WITHOUT ESOPHAGITIS: Primary | ICD-10-CM

## 2024-04-26 RX ORDER — OMEPRAZOLE 20 MG/1
20 CAPSULE, DELAYED RELEASE ORAL
Qty: 30 CAPSULE | Refills: 11 | Status: SHIPPED | OUTPATIENT
Start: 2024-04-26 | End: 2025-04-26

## 2024-05-01 ENCOUNTER — TREATMENT (OUTPATIENT)
Dept: PHYSICAL THERAPY | Facility: CLINIC | Age: 64
End: 2024-05-01
Payer: COMMERCIAL

## 2024-05-01 DIAGNOSIS — M54.6 ACUTE RIGHT-SIDED THORACIC BACK PAIN: Primary | ICD-10-CM

## 2024-05-01 PROCEDURE — 97110 THERAPEUTIC EXERCISES: CPT | Mod: GP,CQ | Performed by: PHYSICAL THERAPY ASSISTANT

## 2024-05-01 NOTE — PROGRESS NOTES
"  Physical Therapy    Physical Therapy Treatment      Patient Name: Beatrice Moya  MRN: [id number]  Today's Date: 5/1/2024   [id number]  PT Therapeutic Procedures Time Entry  Therapeutic Exercise Time Entry: 45    Insurance : [place]  Authorization /Certification / Visits allowed: 60    Visit: 3    Assessment:  States the towel placement is confusing ; better w/ OP from her hand. Back to the wall offers proprioceptive feedback for posture.     Plan:  Foam roller for thoracic extension    Current Problem:   1. Acute right-sided thoracic back pain  Follow Up In Physical Therapy              Subjective    Still has \"stabbing\" intermittent pain along her scapula .   Pain: 5/10 VAS         Objective     Swayback posture , corrects w/ back to wall        TREATMENT  Manual Therapy   Not today   Supine c-spine R shoulder mobs, Prone R T-spine x 15 min  Therapeutic exercise:   Supine yellow loop bilat ER x 20  Standing hor abd back to wall x 20  #2 serratus press x 20  Standing rows back to wall YTB x 20 reps   Standing rows back to wall YTB x 20 reps   SNAGs x 10 ea--> cervical rotation w/ OP x 10 each   Thread the needle x 10 ea c/o increased scap symptoms  S/L book opener x 10 reps w/ 3 sec hold R/L     Not today:   Prone-W,T x 10 ea  Upper cervical extension over towel x 20  Doorway pec stretch 5x10\"  Wall wash x 20  Prone HP to midback EOS       Goals:  Active       PT Problem       Pt will demonstrate full and pain-free thoracolumbar AROM for improved ability to ride bike without pain       Start:  03/14/24    Expected End:  06/11/24            Pt will increase serratus anterior strength by 1 MMT grade for improved scapular stability       Start:  03/14/24    Expected End:  06/11/24            Pt will be independent in HEP for home maintenance        Start:  03/14/24    Expected End:  06/11/24                    "

## 2024-05-15 ENCOUNTER — TREATMENT (OUTPATIENT)
Dept: PHYSICAL THERAPY | Facility: CLINIC | Age: 64
End: 2024-05-15
Payer: COMMERCIAL

## 2024-05-15 DIAGNOSIS — M54.6 ACUTE RIGHT-SIDED THORACIC BACK PAIN: ICD-10-CM

## 2024-05-15 PROCEDURE — 97140 MANUAL THERAPY 1/> REGIONS: CPT | Mod: GP,CQ

## 2024-05-15 PROCEDURE — 97110 THERAPEUTIC EXERCISES: CPT | Mod: GP,CQ

## 2024-05-15 NOTE — PROGRESS NOTES
"Physical Therapy      Physical Therapy    Physical Therapy Treatment      Patient Name: Beatrice Moya  MRN: [id number]  Today's Date: 5/15/2024     Insurance : [employer]  Authorization /Certification / Visits allowed: 60    Visit: 5    Assessment:  Pt was able to progress serratus work    Plan:  Cont MT as needed    Current Problem:   1. Acute right-sided thoracic back pain  Follow Up In Physical Therapy              Subjective    \"I am better, but it is still there.  I'd say I am about 75% improved from the first visit.\"    Pain: 0/10 R Scap         Objective     Swayback posture , corrects w/ back to wall        TREATMENT    Therapeutic exercise:   Supine foam roller chest/pec stretch x 3 min  Supine Serratus Press #3 x 20  Supine red band Bilat ER x 20  S/L open books 10x5\"  Supine R shoulder/UT mobs x 30 min  Red band hor abd at wall x 20  Red band Rows against wall x 20  #1 Serratus Press x 20, ea  Sandy carry #5 3x30\"  Supine HP to mid back x 10 min EOS           Goals:  Active       PT Problem       Pt will demonstrate full and pain-free thoracolumbar AROM for improved ability to ride bike without pain       Start:  03/14/24    Expected End:  06/11/24            Pt will increase serratus anterior strength by 1 MMT grade for improved scapular stability       Start:  03/14/24    Expected End:  06/11/24            Pt will be independent in HEP for home maintenance        Start:  03/14/24    Expected End:  06/11/24                    "

## 2024-05-29 ENCOUNTER — TREATMENT (OUTPATIENT)
Dept: PHYSICAL THERAPY | Facility: CLINIC | Age: 64
End: 2024-05-29
Payer: COMMERCIAL

## 2024-05-29 DIAGNOSIS — M54.6 ACUTE RIGHT-SIDED THORACIC BACK PAIN: ICD-10-CM

## 2024-05-29 PROCEDURE — 97110 THERAPEUTIC EXERCISES: CPT | Mod: GP,CQ

## 2024-05-29 NOTE — PROGRESS NOTES
"  Physical Therapy    Physical Therapy Treatment      Patient Name: Beatrice Moya  MRN: [id number]  Today's Date: 5/29/2024     Insurance : [place]  Authorization /Certification / Visits allowed: 60    Visit: 6    Assessment:  R infra continues to show tightness that responds somewhat to STM.  Pt continues to demo rounded shoulder posture which may be contributing to posterior scap sx's    Plan:  Cont MT as needed. Post shoulder strength    Current Problem:   1. Acute right-sided thoracic back pain  Follow Up In Physical Therapy              Subjective    \"I am an increment better.\"    Pain: 0/10 R Scap         Objective   Rounded shoulder posture       TREATMENT    Therapeutic exercise:   Supine foam roller chest/pec stretch x 3 min  Supine Serratus Press #3 x 20  Supine red band Bilat ER, R/S flex, R/S ER@90` x 20  S/L ER, abd x 20 ea#2  Supine R shoulder mobs, STM infra x 5 min  Red band hor abd at wall  ER x 20 ea  Serratus wall wash x 20  Red band-rows, lat pull x 20  Doorway pec stretch 10x10\"  #1 Serratus Press x 20, ea  Bent over Rows, scaption x 20 ea, #2  Supine HP to mid back x 10 min EOS           Goals:  Active       PT Problem       Pt will demonstrate full and pain-free thoracolumbar AROM for improved ability to ride bike without pain       Start:  03/14/24    Expected End:  06/11/24            Pt will increase serratus anterior strength by 1 MMT grade for improved scapular stability       Start:  03/14/24    Expected End:  06/11/24            Pt will be independent in HEP for home maintenance        Start:  03/14/24    Expected End:  06/11/24                    "

## 2024-06-14 ENCOUNTER — TREATMENT (OUTPATIENT)
Dept: PHYSICAL THERAPY | Facility: CLINIC | Age: 64
End: 2024-06-14
Payer: COMMERCIAL

## 2024-06-14 DIAGNOSIS — M54.6 ACUTE RIGHT-SIDED THORACIC BACK PAIN: ICD-10-CM

## 2024-06-14 PROCEDURE — 97140 MANUAL THERAPY 1/> REGIONS: CPT | Mod: GP,CQ

## 2024-06-14 PROCEDURE — 97110 THERAPEUTIC EXERCISES: CPT | Mod: GP,CQ

## 2024-07-01 ENCOUNTER — TREATMENT (OUTPATIENT)
Dept: PHYSICAL THERAPY | Facility: CLINIC | Age: 64
End: 2024-07-01
Payer: COMMERCIAL

## 2024-07-01 DIAGNOSIS — M54.6 ACUTE RIGHT-SIDED THORACIC BACK PAIN: ICD-10-CM

## 2024-07-01 PROCEDURE — 97140 MANUAL THERAPY 1/> REGIONS: CPT | Mod: GP,CQ

## 2024-07-01 PROCEDURE — 97110 THERAPEUTIC EXERCISES: CPT | Mod: GP,CQ

## 2024-07-01 NOTE — PROGRESS NOTES
"Physical Therapy    Physical Therapy Treatment      Patient Name: Beatrice Moya  MRN: [id number]  Today's Date: 7/1/2024     Insurance : [employer]  Authorization /Certification / Visits allowed: 60    Visit: 8    Assessment:  Cues to engage post scap musculature that decreased tension when employed.    Plan:  Post sh strength    Current Problem:   1. Acute right-sided thoracic back pain  Follow Up In Physical Therapy                Subjective    \"I thought I was more sore after last session, but the next day and really until today, I flelt much better.  I woke very tight and sore in the L UT area..\"    Pain:   0/10 now, 6/10 bilat UT's upon waking        Objective   Rounded shoulder posture       TREATMENT    Therapeutic exercise:   L sh mobs supine, Bilat c-spine STM x 10 min  Supine Serratus Press #2 x 20  Supine chin tuck x 10  Supine red band Bilat ER, R/S flex, R/S ER@90` x 20  S/L ER, abd x 20 ea, #2  Prone mobs x5 min  Prone W's x 20  Serratus wall wash x 20  Red band-rows, ext ,flex, IR, ER, abd x 20 ea  Doorway pec stretch 10x10\"  Bent over Rows x 20 ea, #2           Goals:  Active       PT Problem       Pt will demonstrate full and pain-free thoracolumbar AROM for improved ability to ride bike without pain       Start:  03/14/24    Expected End:  06/11/24            Pt will increase serratus anterior strength by 1 MMT grade for improved scapular stability       Start:  03/14/24    Expected End:  06/11/24            Pt will be independent in HEP for home maintenance        Start:  03/14/24    Expected End:  06/11/24                    "

## 2024-07-15 ENCOUNTER — TREATMENT (OUTPATIENT)
Dept: PHYSICAL THERAPY | Facility: CLINIC | Age: 64
End: 2024-07-15
Payer: COMMERCIAL

## 2024-07-15 DIAGNOSIS — M54.6 ACUTE RIGHT-SIDED THORACIC BACK PAIN: ICD-10-CM

## 2024-07-15 PROCEDURE — 97110 THERAPEUTIC EXERCISES: CPT | Mod: GP,CQ

## 2024-07-15 PROCEDURE — 97140 MANUAL THERAPY 1/> REGIONS: CPT | Mod: GP,CQ

## 2024-07-15 NOTE — PROGRESS NOTES
"Physical Therapy    Physical Therapy Treatment      Patient Name: Beatrice Moya  MRN: [id number]  Today's Date: 7/15/2024     Insurance : [employer]  Authorization /Certification / Visits allowed: 60    Visit: 9    Assessment:  Pt was able to increase resistance w/o difficulty    Plan:  Re -eval next session  Issue green band    Current Problem:   1. Acute right-sided thoracic back pain  Follow Up In Physical Therapy                Subjective    \"I think I am 80-90% improved overall.\"    Pain:   0/10 now, 2-3/10 central lower c-spine, at the worst.        Objective   Rounded shoulder posture       TREATMENT    Therapeutic exercise:   L sh mobs supine, Bilat c-spine- mobs(supine, prone) x 15 min  Bilat Pec/Biceps stretch x 2 min  Supine chin tuck x 10  Supine Hor abd, Bilat ER, R/S flex, R/S ER@90` x 20  Prone mobs x5 min  Wall angels x 20  Serratus wall wash x 20  Red band-rows, ext ,flex, IR, ER, abd x 20 ea  Doorway pec stretch 10x10\"  Bent over Rows x 20 ea, #3           Goals:  Active       PT Problem       Pt will demonstrate full and pain-free thoracolumbar AROM for improved ability to ride bike without pain       Start:  03/14/24    Expected End:  06/11/24            Pt will increase serratus anterior strength by 1 MMT grade for improved scapular stability       Start:  03/14/24    Expected End:  06/11/24            Pt will be independent in HEP for home maintenance        Start:  03/14/24    Expected End:  06/11/24                    "

## 2024-07-29 ENCOUNTER — TREATMENT (OUTPATIENT)
Dept: PHYSICAL THERAPY | Facility: CLINIC | Age: 64
End: 2024-07-29
Payer: COMMERCIAL

## 2024-07-29 DIAGNOSIS — M54.6 ACUTE RIGHT-SIDED THORACIC BACK PAIN: ICD-10-CM

## 2024-07-29 PROCEDURE — 97110 THERAPEUTIC EXERCISES: CPT | Mod: GP,CQ

## 2024-07-29 PROCEDURE — 97140 MANUAL THERAPY 1/> REGIONS: CPT | Mod: GP,CQ

## 2024-07-29 NOTE — PROGRESS NOTES
"Physical Therapy    Physical Therapy Treatment      Patient Name: Beatrice Moya  MRN: [id number]  Today's Date: 7/29/2024     Insurance : [employer]  Authorization /Certification / Visits allowed: 60    Visit: 10    Assessment:  Pt will return for one more visit, per 1` PT    Pt reports doing well overall, about 80% improvement from first session. Will follow up for one additional treatment visit to streamline HEP and then will discharge    Treatment performed by Trista Sierra, PTA    Objective measures, re-evaluation performed by Mickey Bedoya PT, DPT      Plan:  Re -eval next session  Issue green band    Current Problem:   1. Acute right-sided thoracic back pain  Follow Up In Physical Therapy                Subjective    \"No pain now, but I had an episode a couple of days again, and my sister worked it out.  I also slept funny and it was tight this morning, but it has worked itself out.  Less HEP w/ sister visiting.\"    Pain:   0/10 now, 2-3/10 central lower c-spine, at the worst.        Objective   Re-eval     NDI 26%       TREATMENT    Therapeutic exercise:   L sh mobs supine, Bilat c-spine- mobs(supine, prone) x 15 min  Supine chin tuck x 10  DNF x 10  Supine Hor abd, Bilat ER x 20 ea, green band  Prone mobs x5 min  Prone W's x 20  Wall angels x 20  Serratus wall wash x 20  Green band-rows, lat pulls x 20  Doorway pec stretch 10x10\"  Upright posture 10x10\", #5  Farmer carry #5 x 2, 50'           Goals:  Active       PT Problem       Pt will demonstrate full and pain-free thoracolumbar AROM for improved ability to ride bike without pain       Start:  03/14/24    Expected End:  06/11/24            Pt will increase serratus anterior strength by 1 MMT grade for improved scapular stability       Start:  03/14/24    Expected End:  06/11/24            Pt will be independent in HEP for home maintenance        Start:  03/14/24    Expected End:  06/11/24                    "

## 2024-08-14 ENCOUNTER — TREATMENT (OUTPATIENT)
Dept: PHYSICAL THERAPY | Facility: CLINIC | Age: 64
End: 2024-08-14
Payer: COMMERCIAL

## 2024-08-14 DIAGNOSIS — M54.6 ACUTE RIGHT-SIDED THORACIC BACK PAIN: ICD-10-CM

## 2024-08-14 PROCEDURE — 97110 THERAPEUTIC EXERCISES: CPT | Mod: GP,CQ

## 2024-08-14 PROCEDURE — 97140 MANUAL THERAPY 1/> REGIONS: CPT | Mod: GP,CQ

## 2024-08-14 NOTE — PROGRESS NOTES
"Physical Therapy    Physical Therapy Treatment      Patient Name: Beatrice Moya  MRN: [id number]  Today's Date: 8/14/2024     Insurance : [employer]  Authorization /Certification / Visits allowed: 60    Visit: 11    Assessment:  Some cues to decrease intensity of posture correction    Treatment performed by Trista Sierra, MISTI    Discharge summary performed and authored by Mickey Bedoya PT,DPT    Pt re-evaluated at last session; this final session was to review HEP for continued home maintenance. Pt will be discharged to HEP     Plan:  Discharge to HEP per 1` PT    Current Problem:   1. Acute right-sided thoracic back pain  Follow Up In Physical Therapy                  Subjective    \"Less HEP 2` sister visiting and I contracted COVID, but I am negative now.  [place] [employer], so I practiced my posture.\"    Pain:   0/10 now, 2-3/10 central lower c-spine, at the worst.        Objective        NDI 26%       TREATMENT    Therapeutic exercise:   L sh mobs supine, Bilat c-spine- mobs(supine, prone) x 15 min  DNF x 10  Supine Hor abd, Bilat ER x 20 ea, green band  Prone W's x 20  Wall angels x 20  Serratus wall wash x 20, green  Green band-rows, lat pulls x 20  Doorway pec stretch 10x10\"  Upright posture 5x20\", #5  Farmer carry #10 x 2, 50'           Goals:  Active       PT Problem       Pt will demonstrate full and pain-free thoracolumbar AROM for improved ability to ride bike without pain       Start:  03/14/24    Expected End:  06/11/24            Pt will increase serratus anterior strength by 1 MMT grade for improved scapular stability       Start:  03/14/24    Expected End:  06/11/24            Pt will be independent in HEP for home maintenance        Start:  03/14/24    Expected End:  06/11/24                    "

## 2024-09-23 NOTE — PROGRESS NOTES
"Physical Therapy    Physical Therapy Treatment      Patient Name: Beatrice Moya  MRN: [id number]  Today's Date: 6/14/2024     Insurance : [employer]  Authorization /Certification / Visits allowed: 60    Visit: 6    Assessment:  Re-issued ex's for increased strength work at home    Plan:  Cont MT as needed. Post shoulder strength    Current Problem:   1. Acute right-sided thoracic back pain  Follow Up In Physical Therapy                Subjective    \"I am overall better, but I took a long car ride and between negotiating my heavy suitcase and and uncomfortable seat, the neck and shoulders are a little more sore.\"    Pain: 2/10 R Scap , L scap, Sub occ.        Objective   Rounded shoulder posture       TREATMENT    Therapeutic exercise:   R/L sh mobs in seated and supine, Bilat UT STM x 10 min  Supine Serratus Press #3 x 20  Supine red band Bilat ER, R/S flex, R/S ER@90` x 20  S/L ER, abd x 20 ea#2  Serratus wall wash x 20  Pulleys x 20  UBE 2/2  Red band-rows, ext,flex, IR, ER, abd x 20 ea  Doorway pec stretch 10x10\"  Bent over Rows, scaption x 20 ea, #2  Supine HP to c-spine, EOS           Goals:  Active       PT Problem       Pt will demonstrate full and pain-free thoracolumbar AROM for improved ability to ride bike without pain       Start:  03/14/24    Expected End:  06/11/24            Pt will increase serratus anterior strength by 1 MMT grade for improved scapular stability       Start:  03/14/24    Expected End:  06/11/24            Pt will be independent in HEP for home maintenance        Start:  03/14/24    Expected End:  06/11/24                    "
hide

## 2025-03-28 DIAGNOSIS — K21.9 GASTROESOPHAGEAL REFLUX DISEASE WITHOUT ESOPHAGITIS: ICD-10-CM

## 2025-03-28 RX ORDER — OMEPRAZOLE 20 MG/1
CAPSULE, DELAYED RELEASE ORAL
Qty: 30 CAPSULE | Refills: 11 | Status: SHIPPED | OUTPATIENT
Start: 2025-03-28

## 2025-04-28 DIAGNOSIS — K21.9 GASTROESOPHAGEAL REFLUX DISEASE WITHOUT ESOPHAGITIS: ICD-10-CM

## 2025-04-30 RX ORDER — OMEPRAZOLE 20 MG/1
CAPSULE, DELAYED RELEASE ORAL
Qty: 30 CAPSULE | Refills: 11 | Status: SHIPPED | OUTPATIENT
Start: 2025-04-30